# Patient Record
Sex: FEMALE | Race: WHITE | NOT HISPANIC OR LATINO | Employment: FULL TIME | URBAN - METROPOLITAN AREA
[De-identification: names, ages, dates, MRNs, and addresses within clinical notes are randomized per-mention and may not be internally consistent; named-entity substitution may affect disease eponyms.]

---

## 2024-01-08 ENCOUNTER — TELEPHONE (OUTPATIENT)
Dept: OBSTETRICS AND GYNECOLOGY | Facility: CLINIC | Age: 44
End: 2024-01-08

## 2024-01-08 NOTE — TELEPHONE ENCOUNTER
1/8/2024 LEFT VM FOR PATIENT TO CALL BACK TO RESCHEDULE 1/26/24 APPT AT 10:30 WITH DONELL JENNINGS.  PROVIDER IS OUT OF OFFICE ON THIS DAY.   1/9/2024 LEFT VM FOR PATIENT TO RETURN CALL TO RESCHEDULE APPT.